# Patient Record
Sex: FEMALE | Race: WHITE | NOT HISPANIC OR LATINO | Employment: FULL TIME | URBAN - METROPOLITAN AREA
[De-identification: names, ages, dates, MRNs, and addresses within clinical notes are randomized per-mention and may not be internally consistent; named-entity substitution may affect disease eponyms.]

---

## 2020-11-18 ENCOUNTER — INITIAL CONSULT (OUTPATIENT)
Dept: UROGYNECOLOGY | Facility: CLINIC | Age: 50
End: 2020-11-18
Payer: COMMERCIAL

## 2020-11-18 VITALS
DIASTOLIC BLOOD PRESSURE: 77 MMHG | HEIGHT: 72 IN | BODY MASS INDEX: 39.68 KG/M2 | SYSTOLIC BLOOD PRESSURE: 136 MMHG | HEART RATE: 61 BPM | WEIGHT: 293 LBS

## 2020-11-18 DIAGNOSIS — N81.83 INCOMPETENCE OF RECTOVAGINAL TISSUE: ICD-10-CM

## 2020-11-18 DIAGNOSIS — N81.11 CYSTOCELE, MIDLINE: ICD-10-CM

## 2020-11-18 DIAGNOSIS — R35.0 URINARY FREQUENCY: Primary | ICD-10-CM

## 2020-11-18 DIAGNOSIS — N95.2 POSTMENOPAUSE ATROPHIC VAGINITIS: ICD-10-CM

## 2020-11-18 LAB
BILIRUB SERPL-MCNC: ABNORMAL MG/DL
BLOOD URINE, POC: ABNORMAL
CLARITY, POC UA: CLEAR
COLOR, POC UA: YELLOW
GLUCOSE UR QL STRIP: ABNORMAL
KETONES UR QL STRIP: ABNORMAL
LEUKOCYTE ESTERASE URINE, POC: ABNORMAL
NITRITE, POC UA: ABNORMAL
PH, POC UA: 5
PROTEIN, POC: ABNORMAL
SP GR UR STRIP: 1.01
UROBILINOGEN, POC UA: ABNORMAL

## 2020-11-18 PROCEDURE — 99999 PR PBB SHADOW E&M-NEW PATIENT-LVL III: CPT | Mod: PBBFAC,,, | Performed by: OBSTETRICS & GYNECOLOGY

## 2020-11-18 PROCEDURE — 99203 OFFICE O/P NEW LOW 30 MIN: CPT | Mod: 25,S$GLB,, | Performed by: OBSTETRICS & GYNECOLOGY

## 2020-11-18 PROCEDURE — 81002 URINALYSIS NONAUTO W/O SCOPE: CPT | Mod: S$GLB,,, | Performed by: OBSTETRICS & GYNECOLOGY

## 2020-11-18 PROCEDURE — 81002 POCT URINE DIPSTICK WITHOUT MICROSCOPE: ICD-10-PCS | Mod: S$GLB,,, | Performed by: OBSTETRICS & GYNECOLOGY

## 2020-11-18 PROCEDURE — 99999 PR PBB SHADOW E&M-NEW PATIENT-LVL III: ICD-10-PCS | Mod: PBBFAC,,, | Performed by: OBSTETRICS & GYNECOLOGY

## 2020-11-18 PROCEDURE — 99203 PR OFFICE/OUTPT VISIT, NEW, LEVL III, 30-44 MIN: ICD-10-PCS | Mod: 25,S$GLB,, | Performed by: OBSTETRICS & GYNECOLOGY

## 2020-11-18 RX ORDER — ESTRADIOL 0.1 MG/G
1 CREAM VAGINAL
Qty: 12 G | Refills: 11 | Status: SHIPPED | OUTPATIENT
Start: 2020-11-18 | End: 2022-03-01

## 2020-11-18 RX ORDER — MELOXICAM 7.5 MG/1
7.5 TABLET ORAL DAILY
COMMUNITY
Start: 2020-11-02 | End: 2021-11-24

## 2020-11-18 RX ORDER — DICLOFENAC SODIUM 10 MG/G
GEL TOPICAL 2 TIMES DAILY
COMMUNITY
Start: 2020-11-02 | End: 2021-11-24

## 2020-11-18 NOTE — PROGRESS NOTES
Subjective:      Patient ID: Karly Solitario is a 50 y.o. female.    Chief Complaint:  Consult      History of Present Illness  50-year-old multipara history of hysterectomy done abdominally unclear etiology presents after an ER visit where she was concerned about some type of protrusion through the vagina.  Was told that her vagina or bladder had fallen she then went to another doctor who set was assist patient is very confused she is not in any pain however there is substantial dryness vagina          No past medical history on file.    No past surgical history on file.    GYN & OB History  No LMP recorded.   Date of Last Pap: No result found    OB History   No obstetric history on file.       Health Maintenance       Date Due Completion Date    Hepatitis C Screening 1969 ---    Lipid Panel 1969 ---    HIV Screening 02/13/1984 ---    TETANUS VACCINE 02/13/1987 ---    Cervical Cancer Screening 02/13/1990 ---    Mammogram 02/13/2009 ---    Shingles Vaccine (1 of 2) 02/13/2019 ---    Colorectal Cancer Screening 02/13/2019 ---    Influenza Vaccine (1) 08/01/2020 ---          No family history on file.    Social History     Socioeconomic History    Marital status:      Spouse name: Not on file    Number of children: Not on file    Years of education: Not on file    Highest education level: Not on file   Occupational History    Not on file   Social Needs    Financial resource strain: Not on file    Food insecurity     Worry: Not on file     Inability: Not on file    Transportation needs     Medical: Not on file     Non-medical: Not on file   Tobacco Use    Smoking status: Former Smoker   Substance and Sexual Activity    Alcohol use: Not on file    Drug use: Not on file    Sexual activity: Not on file   Lifestyle    Physical activity     Days per week: Not on file     Minutes per session: Not on file    Stress: Not on file   Relationships    Social connections     Talks on phone: Not on file      Gets together: Not on file     Attends Taoist service: Not on file     Active member of club or organization: Not on file     Attends meetings of clubs or organizations: Not on file     Relationship status: Not on file   Other Topics Concern    Not on file   Social History Narrative    Not on file       Review of Systems  Review of Systems   Genitourinary: Positive for vaginal pain.     Bulge     Objective:   /77   Pulse 61   Ht 6' (1.829 m)   Wt 133 kg (293 lb 3.4 oz)   BMI 39.77 kg/m²     Physical Exam   Genitourinary: There is a rectocele, a cystocele and atrophy in the vagina. Right adnexum displays no mass. Left adnexum displays no mass. Cervix exhibits absence. Uterus is absent.   POP-Q  Aa: -1 Ba:  C: -7   GH: 3 PB: 2 TVL: 10   Ap: -1 Bp:  D:                           Assessment:     1. Urinary frequency    2. Postmenopause atrophic vaginitis    3. Incompetence of rectovaginal tissue    4. Cystocele, midline            Plan:     1. Urinary frequency    2. Postmenopause atrophic vaginitis    3. Incompetence of rectovaginal tissue    4. Cystocele, midline      After examination I then your reviewed her anatomy on the [employer] interactive web site from that point I then discussed options from  Pessary  2.  Transvaginal site specific repair with suspension  3.  Robotic sacral colpopexy.  Think at this point patient to appreciates that she has been told the proper diagnosis she is going to take to consider in with direction she wants to go but the meantime she does 1 conservative she 0 pessary we will 1st give her vaginal estrogen for period of 4-6 weeks before bring her back for pessary fitting.  Patient many questions all addressed total time of this visit 30 min greater 50% of time 20 min in direct discussion reviewing all aspects of care  There are no Patient Instructions on file for this visit.

## 2020-12-31 ENCOUNTER — OFFICE VISIT (OUTPATIENT)
Dept: UROGYNECOLOGY | Facility: CLINIC | Age: 50
End: 2020-12-31
Payer: COMMERCIAL

## 2020-12-31 VITALS
HEART RATE: 60 BPM | HEIGHT: 72 IN | WEIGHT: 293 LBS | SYSTOLIC BLOOD PRESSURE: 159 MMHG | DIASTOLIC BLOOD PRESSURE: 92 MMHG | BODY MASS INDEX: 39.68 KG/M2

## 2020-12-31 DIAGNOSIS — Z46.89 PESSARY MAINTENANCE: ICD-10-CM

## 2020-12-31 DIAGNOSIS — N95.2 ATROPHIC VAGINITIS: ICD-10-CM

## 2020-12-31 DIAGNOSIS — N81.11 CYSTOCELE, MIDLINE: ICD-10-CM

## 2020-12-31 DIAGNOSIS — R35.0 URINARY FREQUENCY: Primary | ICD-10-CM

## 2020-12-31 DIAGNOSIS — N81.6 RECTOCELE: ICD-10-CM

## 2020-12-31 LAB
BILIRUB SERPL-MCNC: ABNORMAL MG/DL
BLOOD URINE, POC: ABNORMAL
CLARITY, POC UA: CLEAR
COLOR, POC UA: YELLOW
GLUCOSE UR QL STRIP: ABNORMAL
KETONES UR QL STRIP: ABNORMAL
LEUKOCYTE ESTERASE URINE, POC: ABNORMAL
NITRITE, POC UA: ABNORMAL
PH, POC UA: 5
PROTEIN, POC: ABNORMAL
SPECIFIC GRAVITY, POC UA: 1
UROBILINOGEN, POC UA: ABNORMAL

## 2020-12-31 PROCEDURE — 57160 PR FIT/INSERT INTRAVAG SUPPORT DEVICE: ICD-10-PCS | Mod: S$GLB,,, | Performed by: NURSE PRACTITIONER

## 2020-12-31 PROCEDURE — 99214 PR OFFICE/OUTPT VISIT, EST, LEVL IV, 30-39 MIN: ICD-10-PCS | Mod: 25,S$GLB,, | Performed by: NURSE PRACTITIONER

## 2020-12-31 PROCEDURE — 99214 OFFICE O/P EST MOD 30 MIN: CPT | Mod: 25,S$GLB,, | Performed by: NURSE PRACTITIONER

## 2020-12-31 PROCEDURE — 81002 POCT URINE DIPSTICK WITHOUT MICROSCOPE: ICD-10-PCS | Mod: S$GLB,,, | Performed by: NURSE PRACTITIONER

## 2020-12-31 PROCEDURE — 99999 PR PBB SHADOW E&M-EST. PATIENT-LVL III: CPT | Mod: PBBFAC,,, | Performed by: NURSE PRACTITIONER

## 2020-12-31 PROCEDURE — 57160 INSERT PESSARY/OTHER DEVICE: CPT | Mod: S$GLB,,, | Performed by: NURSE PRACTITIONER

## 2020-12-31 PROCEDURE — 99999 PR PBB SHADOW E&M-EST. PATIENT-LVL III: ICD-10-PCS | Mod: PBBFAC,,, | Performed by: NURSE PRACTITIONER

## 2020-12-31 PROCEDURE — 81002 URINALYSIS NONAUTO W/O SCOPE: CPT | Mod: S$GLB,,, | Performed by: NURSE PRACTITIONER

## 2020-12-31 NOTE — PROGRESS NOTES
"Subjective:       Patient ID: Karly Solitario is a 50 y.o. female.    Chief Complaint: pessary trial    HPI  Karly Solitario is a 50 y.o. female who presents today for possible pessary placement.  She was originally seen by Dr. Ham on 11/18/20.  At that visit he reviewed with her that she had a cystocele and rectocele and her options regarding the prolapse.  The pt is also concerned about a "cyst" that she had in the vaginal area.  She states that this was noted on [employer] and that a Dr. Vazquez had drained the cyst and told her it was an embryonic cyst?  The pt states that she can feel something near the introitus, but it is difficult to determine if she is feeling the prolapse or the "cyst" that was drained.  Otherwise, she has been using the estrace cream and does feel that this has been helpful for her vaginal dryness and tenderness.  She would like to try the pessary but thinks in time she will consider surgical correction.  She denies any other acute complaints/concerns and is ready to proceed.    Review of Systems   Constitutional: Negative for activity change, fever and unexpected weight change.   HENT: Negative for hearing loss.    Eyes: Negative for visual disturbance.   Respiratory: Negative for shortness of breath and wheezing.    Cardiovascular: Negative for chest pain, palpitations and leg swelling.   Gastrointestinal: Negative for abdominal pain, constipation and diarrhea.   Genitourinary: Positive for frequency. Negative for dyspareunia, dysuria, urgency, vaginal bleeding and vaginal discharge.        Vaginal bulge   Musculoskeletal: Negative for gait problem and neck pain.   Skin: Negative for rash and wound.   Allergic/Immunologic: Negative for immunocompromised state.   Neurological: Negative for tremors, speech difficulty and weakness.   Hematological: Does not bruise/bleed easily.   Psychiatric/Behavioral: Negative for agitation and confusion.       Objective:      Physical Exam  Constitutional:  "      General: She is not in acute distress.     Appearance: She is well-developed.   HENT:      Head: Normocephalic and atraumatic.   Neck:      Musculoskeletal: Neck supple.      Thyroid: No thyromegaly.   Pulmonary:      Effort: Pulmonary effort is normal. No respiratory distress.   Abdominal:      Palpations: Abdomen is soft.      Tenderness: There is abdominal tenderness in the left lower quadrant.      Hernia: No hernia is present.   Musculoskeletal: Normal range of motion.   Skin:     General: Skin is warm and dry.      Findings: No rash.   Neurological:      Mental Status: She is alert and oriented to person, place, and time.   Psychiatric:         Behavior: Behavior normal.       Pelvic Exam:  V: No lesions. No palpable nodes.   Va: no discharge or bleeding. No sign of a cyst noted near the introitus.  Meatus:No caruncle or stenosis  Urethra: Non tender. No suburethral masses.  Cx/Cuff: Normal   Uterus: surgically absent  Ad: No mass, mild tenderness on the Left.  Levators :Symmetrical. Normal tone. Non tender.  BL: Non tender  RV: No hemorrhoids.    POP-Q  Aa: 0 Ba: 0 C: -7   GH: 3 PB: 2 TVL: 10   Ap: 0 Bp: 0 D:            Assessment:       1. Urinary frequency    2. Cystocele, midline    3. Rectocele    4. Pessary maintenance    5. Atrophic vaginitis          Procedure note- After betadine irrigation of the vagina, #4 ring pessary was inserted into the vagina.  Pt ambulated in the room and denies any discomfort.  Pt was able to do various exercises in the room and retain the pessary and the pessary was comfortable.  NP did review with the pt how to remove and replace the pessary herself.   NP reminded pt that the first 24-48 hours are the most likely time for the pessary to fall out and to monitor the toilet before flushing.  Pt verbalized understanding.      Plan:       Urinary frequency- monitor    Cystocele, midline- trial of #4 ring pessary    Rectocele- as noted above    Pessary maintenance- as noted  above    Atrophic vaginitis- continue with estrace cream    RTC 2-3 months

## 2021-11-24 ENCOUNTER — HOSPITAL ENCOUNTER (OUTPATIENT)
Dept: RADIOLOGY | Facility: HOSPITAL | Age: 51
Discharge: HOME | End: 2021-11-24
Attending: PODIATRIST
Payer: COMMERCIAL

## 2021-11-24 ENCOUNTER — OFFICE VISIT (OUTPATIENT)
Dept: PODIATRY | Facility: CLINIC | Age: 51
End: 2021-11-24
Payer: COMMERCIAL

## 2021-11-24 VITALS
SYSTOLIC BLOOD PRESSURE: 134 MMHG | HEIGHT: 72 IN | TEMPERATURE: 98 F | HEART RATE: 77 BPM | DIASTOLIC BLOOD PRESSURE: 76 MMHG | WEIGHT: 293 LBS | BODY MASS INDEX: 39.68 KG/M2

## 2021-11-24 DIAGNOSIS — M84.375A STRESS FRACTURE OF LEFT CALCANEUS: ICD-10-CM

## 2021-11-24 DIAGNOSIS — M79.672 FOOT PAIN, LEFT: ICD-10-CM

## 2021-11-24 DIAGNOSIS — D36.10 NEUROMA: ICD-10-CM

## 2021-11-24 DIAGNOSIS — M79.672 FOOT PAIN, LEFT: Primary | ICD-10-CM

## 2021-11-24 DIAGNOSIS — M76.70 PERONEAL TENDONITIS, UNSPECIFIED LATERALITY: ICD-10-CM

## 2021-11-24 PROCEDURE — 73630 X-RAY EXAM OF FOOT: CPT | Mod: 26,LT,, | Performed by: RADIOLOGY

## 2021-11-24 PROCEDURE — 73630 XR FOOT COMPLETE 3 VIEW LEFT: ICD-10-PCS | Mod: 26,LT,, | Performed by: RADIOLOGY

## 2021-11-24 PROCEDURE — 73630 X-RAY EXAM OF FOOT: CPT | Mod: TC,FY,LT

## 2021-11-24 PROCEDURE — 99204 OFFICE O/P NEW MOD 45 MIN: CPT | Mod: S$GLB,,, | Performed by: PODIATRIST

## 2021-11-24 PROCEDURE — 99999 PR PBB SHADOW E&M-EST. PATIENT-LVL III: CPT | Mod: PBBFAC,,, | Performed by: PODIATRIST

## 2021-11-24 PROCEDURE — 99204 PR OFFICE/OUTPT VISIT, NEW, LEVL IV, 45-59 MIN: ICD-10-PCS | Mod: S$GLB,,, | Performed by: PODIATRIST

## 2021-11-24 PROCEDURE — 99999 PR PBB SHADOW E&M-EST. PATIENT-LVL III: ICD-10-PCS | Mod: PBBFAC,,, | Performed by: PODIATRIST

## 2021-11-24 RX ORDER — ROPINIROLE 1 MG/1
1 TABLET, FILM COATED ORAL NIGHTLY
COMMUNITY
Start: 2021-11-16 | End: 2024-01-11

## 2021-11-24 RX ORDER — DICLOFENAC SODIUM 75 MG/1
75 TABLET, DELAYED RELEASE ORAL 2 TIMES DAILY
Qty: 60 TABLET | Refills: 1 | Status: SHIPPED | OUTPATIENT
Start: 2021-11-24 | End: 2021-12-24

## 2021-11-24 RX ORDER — MELOXICAM 15 MG/1
15 TABLET ORAL DAILY
COMMUNITY
Start: 2021-11-16 | End: 2021-11-24

## 2021-11-24 RX ORDER — GABAPENTIN 300 MG/1
300 CAPSULE ORAL
Qty: 180 CAPSULE | Refills: 1 | Status: SHIPPED | OUTPATIENT
Start: 2021-11-24 | End: 2021-12-24

## 2021-11-24 RX ORDER — GABAPENTIN 300 MG/1
CAPSULE ORAL
COMMUNITY
Start: 2021-11-16 | End: 2021-11-24

## 2021-11-26 ENCOUNTER — TELEPHONE (OUTPATIENT)
Dept: PODIATRY | Facility: CLINIC | Age: 51
End: 2021-11-26
Payer: COMMERCIAL

## 2021-11-27 PROBLEM — D36.10 NEUROMA: Status: ACTIVE | Noted: 2021-11-27

## 2021-11-29 ENCOUNTER — TELEPHONE (OUTPATIENT)
Dept: PODIATRY | Facility: CLINIC | Age: 51
End: 2021-11-29
Payer: COMMERCIAL

## 2021-11-30 ENCOUNTER — HOSPITAL ENCOUNTER (OUTPATIENT)
Dept: RADIOLOGY | Facility: HOSPITAL | Age: 51
Discharge: HOME | End: 2021-11-30
Attending: PODIATRIST
Payer: COMMERCIAL

## 2021-11-30 DIAGNOSIS — M84.375A STRESS FRACTURE OF LEFT CALCANEUS: ICD-10-CM

## 2021-11-30 DIAGNOSIS — M76.70 PERONEAL TENDONITIS, UNSPECIFIED LATERALITY: ICD-10-CM

## 2021-11-30 PROCEDURE — 73718 MRI FOOT (HINDFOOT) LEFT WITHOUT CONTRAST: ICD-10-PCS | Mod: 26,LT,, | Performed by: RADIOLOGY

## 2021-11-30 PROCEDURE — 73718 MRI LOWER EXTREMITY W/O DYE: CPT | Mod: 26,LT,, | Performed by: RADIOLOGY

## 2021-11-30 PROCEDURE — 73718 MRI LOWER EXTREMITY W/O DYE: CPT | Mod: TC,PN,LT

## 2021-12-01 ENCOUNTER — TELEPHONE (OUTPATIENT)
Dept: PODIATRY | Facility: CLINIC | Age: 51
End: 2021-12-01
Payer: COMMERCIAL

## 2021-12-06 ENCOUNTER — PATIENT MESSAGE (OUTPATIENT)
Dept: PODIATRY | Facility: CLINIC | Age: 51
End: 2021-12-06
Payer: COMMERCIAL

## 2021-12-06 ENCOUNTER — TELEPHONE (OUTPATIENT)
Dept: PODIATRY | Facility: CLINIC | Age: 51
End: 2021-12-06
Payer: COMMERCIAL

## 2021-12-15 ENCOUNTER — OFFICE VISIT (OUTPATIENT)
Dept: PODIATRY | Facility: CLINIC | Age: 51
End: 2021-12-15
Payer: COMMERCIAL

## 2021-12-15 VITALS
HEIGHT: 72 IN | TEMPERATURE: 98 F | HEART RATE: 93 BPM | BODY MASS INDEX: 39.68 KG/M2 | DIASTOLIC BLOOD PRESSURE: 97 MMHG | WEIGHT: 293 LBS | SYSTOLIC BLOOD PRESSURE: 153 MMHG

## 2021-12-15 DIAGNOSIS — M72.2 PLANTAR FASCIITIS: ICD-10-CM

## 2021-12-15 DIAGNOSIS — M66.872 NONTRAUMATIC RUPTURE OF TENDON OF LEFT FOOT: Primary | ICD-10-CM

## 2021-12-15 DIAGNOSIS — S93.622D LISFRANC'S SPRAIN, LEFT, SUBSEQUENT ENCOUNTER: ICD-10-CM

## 2021-12-15 PROCEDURE — 99214 PR OFFICE/OUTPT VISIT, EST, LEVL IV, 30-39 MIN: ICD-10-PCS | Mod: S$GLB,,, | Performed by: PODIATRIST

## 2021-12-15 PROCEDURE — 99999 PR PBB SHADOW E&M-EST. PATIENT-LVL III: CPT | Mod: PBBFAC,,, | Performed by: PODIATRIST

## 2021-12-15 PROCEDURE — 99999 PR PBB SHADOW E&M-EST. PATIENT-LVL III: ICD-10-PCS | Mod: PBBFAC,,, | Performed by: PODIATRIST

## 2021-12-15 PROCEDURE — 99214 OFFICE O/P EST MOD 30 MIN: CPT | Mod: S$GLB,,, | Performed by: PODIATRIST

## 2021-12-15 RX ORDER — HYDROCODONE BITARTRATE AND ACETAMINOPHEN 10; 325 MG/1; MG/1
1 TABLET ORAL 2 TIMES DAILY
Qty: 42 TABLET | Refills: 0 | Status: SHIPPED | OUTPATIENT
Start: 2021-12-15 | End: 2022-01-05

## 2021-12-28 ENCOUNTER — PATIENT MESSAGE (OUTPATIENT)
Dept: PODIATRY | Facility: CLINIC | Age: 51
End: 2021-12-28
Payer: COMMERCIAL

## 2022-01-12 ENCOUNTER — OFFICE VISIT (OUTPATIENT)
Dept: PODIATRY | Facility: CLINIC | Age: 52
End: 2022-01-12
Payer: COMMERCIAL

## 2022-01-12 ENCOUNTER — PATIENT MESSAGE (OUTPATIENT)
Dept: PODIATRY | Facility: CLINIC | Age: 52
End: 2022-01-12
Payer: COMMERCIAL

## 2022-01-12 DIAGNOSIS — S93.622D LISFRANC'S SPRAIN, LEFT, SUBSEQUENT ENCOUNTER: ICD-10-CM

## 2022-01-12 DIAGNOSIS — M72.2 PLANTAR FASCIITIS: ICD-10-CM

## 2022-01-12 DIAGNOSIS — M76.70 PERONEAL TENDONITIS, UNSPECIFIED LATERALITY: Primary | ICD-10-CM

## 2022-01-12 DIAGNOSIS — M66.872 NONTRAUMATIC RUPTURE OF TENDON OF LEFT FOOT: ICD-10-CM

## 2022-01-12 PROBLEM — M84.375A STRESS FRACTURE OF LEFT CALCANEUS: Status: RESOLVED | Noted: 2021-11-24 | Resolved: 2022-01-12

## 2022-01-12 PROCEDURE — 99999 PR PBB SHADOW E&M-EST. PATIENT-LVL II: ICD-10-PCS | Mod: PBBFAC,,, | Performed by: PODIATRIST

## 2022-01-12 PROCEDURE — 99214 OFFICE O/P EST MOD 30 MIN: CPT | Mod: S$GLB,,, | Performed by: PODIATRIST

## 2022-01-12 PROCEDURE — 99999 PR PBB SHADOW E&M-EST. PATIENT-LVL II: CPT | Mod: PBBFAC,,, | Performed by: PODIATRIST

## 2022-01-12 PROCEDURE — 99214 PR OFFICE/OUTPT VISIT, EST, LEVL IV, 30-39 MIN: ICD-10-PCS | Mod: S$GLB,,, | Performed by: PODIATRIST

## 2022-01-12 RX ORDER — HYDROCODONE BITARTRATE AND ACETAMINOPHEN 10; 325 MG/1; MG/1
1 TABLET ORAL 2 TIMES DAILY
Qty: 20 TABLET | Refills: 0 | Status: SHIPPED | OUTPATIENT
Start: 2022-01-12 | End: 2022-01-22

## 2022-01-15 NOTE — PROGRESS NOTES
Subjective:       Patient ID: Karly Solitario is a 51 y.o. female.    Chief Complaint: No chief complaint on file.  Patient presents today follow-up left foot injury.    Past Medical History:   Diagnosis Date    Foot pain, bilateral      Past Surgical History:   Procedure Laterality Date    CHOLECYSTECTOMY      CYST REMOVAL      HYSTERECTOMY       History reviewed. No pertinent family history.  Social History     Socioeconomic History    Marital status:    Tobacco Use    Smoking status: Former Smoker    Smokeless tobacco: Never Used   Substance and Sexual Activity    Alcohol use: Not Currently    Drug use: Never    Sexual activity: Not Currently       Current Outpatient Medications   Medication Sig Dispense Refill    estradioL (ESTRACE) 0.01 % (0.1 mg/gram) vaginal cream Place 1 g vaginally every Mon, Wed, Fri. 12 g 11    gabapentin (NEURONTIN) 300 MG capsule Take 1 capsule (300 mg total) by mouth 6 (six) times daily. 180 capsule 1    HYDROcodone-acetaminophen (NORCO)  mg per tablet Take 1 tablet by mouth 2 (two) times a day. for 10 days 20 tablet 0    rOPINIRole (REQUIP) 1 MG tablet Take 1 mg by mouth nightly.       No current facility-administered medications for this visit.     Review of patient's allergies indicates:   Allergen Reactions    Ciprofloxacin     Propoxyphene n-acetaminophen Other (See Comments)       Review of Systems   Musculoskeletal: Positive for arthralgias, gait problem and joint swelling.   All other systems reviewed and are negative.      Objective:      There were no vitals filed for this visit.  Physical Exam  Vitals and nursing note reviewed.   Constitutional:       Appearance: Normal appearance.   Cardiovascular:      Pulses:           Dorsalis pedis pulses are 2+ on the right side and 2+ on the left side.        Posterior tibial pulses are 2+ on the right side and 2+ on the left side.   Pulmonary:      Effort: Pulmonary effort is normal.   Musculoskeletal:          General: Swelling, tenderness, deformity and signs of injury present.      Left foot: Decreased range of motion. Deformity present.        Feet:    Feet:      Right foot:      Protective Sensation: 2 sites tested. 2 sites sensed.      Left foot:      Protective Sensation: 2 sites tested. 2 sites sensed.      Skin integrity: Erythema and warmth present.   Skin:     General: Skin is warm.      Capillary Refill: Capillary refill takes less than 2 seconds.      Findings: Erythema present.   Neurological:      General: No focal deficit present.      Mental Status: She is alert.   Psychiatric:         Mood and Affect: Mood normal.         Behavior: Behavior normal.         Thought Content: Thought content normal.         Judgment: Judgment normal.       MRI Foot (Hindfoot) Left Without Contrast  Order: [id number]   Status: Final result     Visible to patient: Yes (seen)     Next appt: 01/03/2022 at 09:00 AM in Podiatry (Manuel Em DPM)     Dx: Peroneal tendonitis, unspecified late...     1 Result Note     1 Follow-up Encounter    Details    Reading Physician Reading Date Result Priority   Pepe Ryan MD  [phone] 12/1/2021 Routine     Narrative & Impression  EXAMINATION:  MRI FOOT (HINDFOOT) LEFT WITHOUT CONTRAST     CLINICAL HISTORY:  Foot pain, stress fracture suspected, neg xray;  Stress fracture, left foot, initial encounter for fracture     TECHNIQUE:  MRI ankle performed per routine protocol without contrast.     COMPARISON:  Plain films 11/24/2021.     FINDINGS:  Ligaments: Anterior and posterior inferior tibiofibular ligaments are intact.  Anterior talofibular, calcaneofibular and posterior talofibular ligaments are intact.  Deep and superficial components of deltoid ligament are intact.  Spring ligament complex is intact.  Partial thickness tear and scarring involving the dorsal component of the Lisfranc ligament.  The interosseous and plantar components are intact.     Tendons: There is  thickening and increased signal at the insertion of the posterior tibial tendon consistent with a partial thickness tear with tendinosis.  The flexor digitorum longus and flexor hallucis longus tendons are intact with normal signal.  The dorsal ankle extensor and peroneus tendons are intact.  The Achilles tendon is intact.     Joints: No joint effusions. Tibiotalar and subtalar cartilage maintained.     Bones:  No fracture or infiltrative process.  No MRI evidence to suggest a stress fracture of the calcaneus.  Small dorsal and plantar calcaneal spurs.     Miscellaneous: There is thickening of the medial cord of the plantar fascia with mild adjacent perifascial edema consistent with chronic plantar fasciitis.  Sinus tarsi and tarsal tunnel are unremarkable.     Impression:     1. Chronic plantar fasciitis without MRI evidence to suggest calcaneal stress fracture.  2. Partial thickness tear and scarring involving the dorsal component of the Lisfranc ligament complex.  3. Partial thickness tear and tendinosis at the insertion of the posterior tibial tendon.  4. No significant joint effusion.        Electronically signed by: Pepe Ryan  Date:                                            12/01/2021  Time:                                           15:24                                Assessment:       1. Peroneal tendonitis, unspecified laterality    2. Nontraumatic rupture of tendon of left foot    3. Lisfranc's sprain, left, subsequent encounter    4. Plantar fasciitis        Plan:       Patient presents for follow-up injury to the left foot patient states she has been having some pain on the bottom of the left heel overall she is doing better she is going to go ahead and try to transition out of the fracture boot as tolerated she understands she is going to have increased swelling discomfort that is normal as she transitions out of the boot that she has been in for an extended period of time.  Patient states she  still has not started her old job where she is sitting all the time she still on her feet quite a bit at work she did have COVID over [place] and states she was off of her feet for 2 weeks which definitely helped.  Patient continues to take both the gabapentin and the diclofenac which is helping I did give her a prescription for pain medication to take only as necessary I dispensed the patient new diabetic insoles to give her even more support which I do think that she needs in the left foot and I plan to follow up with her in 3 weeks for re-evaluation certainly the patient may be out of the boot for a few hours them back into the boot for few hours as she transitions out of the boot I advised her the additional support should help to address the plantar fascial pain that she is having.  Patient has been advised to contact us with any problems questions or concerns prior to her scheduled follow-up  This note was created using [employer] voice recognition software that occasionally misinterpreted phrases or words.

## 2022-02-07 ENCOUNTER — OFFICE VISIT (OUTPATIENT)
Dept: PODIATRY | Facility: CLINIC | Age: 52
End: 2022-02-07
Payer: COMMERCIAL

## 2022-02-07 VITALS
BODY MASS INDEX: 39.68 KG/M2 | TEMPERATURE: 98 F | HEIGHT: 72 IN | WEIGHT: 293 LBS | DIASTOLIC BLOOD PRESSURE: 80 MMHG | HEART RATE: 62 BPM | SYSTOLIC BLOOD PRESSURE: 126 MMHG

## 2022-02-07 DIAGNOSIS — M79.672 FOOT PAIN, LEFT: ICD-10-CM

## 2022-02-07 DIAGNOSIS — M72.2 PLANTAR FASCIITIS: Primary | ICD-10-CM

## 2022-02-07 DIAGNOSIS — M66.872 NONTRAUMATIC RUPTURE OF TENDON OF LEFT FOOT: ICD-10-CM

## 2022-02-07 PROCEDURE — 99214 OFFICE O/P EST MOD 30 MIN: CPT | Mod: S$GLB,,, | Performed by: PODIATRIST

## 2022-02-07 PROCEDURE — 99999 PR PBB SHADOW E&M-EST. PATIENT-LVL III: CPT | Mod: PBBFAC,,, | Performed by: PODIATRIST

## 2022-02-07 PROCEDURE — 99999 PR PBB SHADOW E&M-EST. PATIENT-LVL III: ICD-10-PCS | Mod: PBBFAC,,, | Performed by: PODIATRIST

## 2022-02-07 PROCEDURE — 99214 PR OFFICE/OUTPT VISIT, EST, LEVL IV, 30-39 MIN: ICD-10-PCS | Mod: S$GLB,,, | Performed by: PODIATRIST

## 2022-02-07 RX ORDER — HYDROCODONE BITARTRATE AND ACETAMINOPHEN 10; 325 MG/1; MG/1
1 TABLET ORAL DAILY
Qty: 21 TABLET | Refills: 0 | Status: SHIPPED | OUTPATIENT
Start: 2022-02-07 | End: 2022-02-28

## 2022-02-08 NOTE — PROGRESS NOTES
Subjective:       Patient ID: Karly Solitario is a 51 y.o. female.    Chief Complaint: Foot Pain, Follow-up, and Foot Problem  Patient presents today follow-up left foot injury.    Past Medical History:   Diagnosis Date    Foot pain, bilateral      Past Surgical History:   Procedure Laterality Date    CHOLECYSTECTOMY      CYST REMOVAL      HYSTERECTOMY       History reviewed. No pertinent family history.  Social History     Socioeconomic History    Marital status:    Tobacco Use    Smoking status: Former Smoker    Smokeless tobacco: Never Used   Substance and Sexual Activity    Alcohol use: Not Currently    Drug use: Never    Sexual activity: Not Currently       Current Outpatient Medications   Medication Sig Dispense Refill    estradioL (ESTRACE) 0.01 % (0.1 mg/gram) vaginal cream Place 1 g vaginally every Mon, Wed, Fri. 12 g 11    gabapentin (NEURONTIN) 300 MG capsule Take 1 capsule (300 mg total) by mouth 6 (six) times daily. 180 capsule 1    HYDROcodone-acetaminophen (NORCO)  mg per tablet Take 1 tablet by mouth once daily. for 21 days 21 tablet 0    rOPINIRole (REQUIP) 1 MG tablet Take 1 mg by mouth nightly.       No current facility-administered medications for this visit.     Review of patient's allergies indicates:   Allergen Reactions    Ciprofloxacin     Propoxyphene n-acetaminophen Other (See Comments)       Review of Systems   Musculoskeletal: Positive for arthralgias, gait problem and joint swelling.   All other systems reviewed and are negative.      Objective:      Vitals:    02/07/22 1037   BP: 126/80   Pulse: 62   Temp: 98 °F (36.7 °C)   Weight: 136.1 kg (300 lb)   Height: 6' (1.829 m)     Physical Exam  Vitals and nursing note reviewed.   Constitutional:       Appearance: Normal appearance.   Cardiovascular:      Pulses:           Dorsalis pedis pulses are 2+ on the right side and 2+ on the left side.        Posterior tibial pulses are 2+ on the right side and 2+ on the  left side.   Pulmonary:      Effort: Pulmonary effort is normal.   Musculoskeletal:         General: Swelling, tenderness, deformity and signs of injury present.      Left foot: Decreased range of motion. Deformity present.        Feet:    Feet:      Right foot:      Protective Sensation: 2 sites tested. 2 sites sensed.      Left foot:      Protective Sensation: 2 sites tested. 2 sites sensed.      Skin integrity: Erythema and warmth present.   Skin:     General: Skin is warm.      Capillary Refill: Capillary refill takes less than 2 seconds.      Findings: Erythema present.   Neurological:      General: No focal deficit present.      Mental Status: She is alert.   Psychiatric:         Mood and Affect: Mood normal.         Behavior: Behavior normal.         Thought Content: Thought content normal.         Judgment: Judgment normal.       MRI Foot (Hindfoot) Left Without Contrast  Order: [id number]   Status: Final result     Visible to patient: Yes (seen)     Next appt: 01/03/2022 at 09:00 AM in Podiatry (Manuel Em DPM)     Dx: Peroneal tendonitis, unspecified late...     1 Result Note     1 Follow-up Encounter    Details    Reading Physician Reading Date Result Priority   Pepe Ryan MD  [phone] 12/1/2021 Routine     Narrative & Impression  EXAMINATION:  MRI FOOT (HINDFOOT) LEFT WITHOUT CONTRAST     CLINICAL HISTORY:  Foot pain, stress fracture suspected, neg xray;  Stress fracture, left foot, initial encounter for fracture     TECHNIQUE:  MRI ankle performed per routine protocol without contrast.     COMPARISON:  Plain films 11/24/2021.     FINDINGS:  Ligaments: Anterior and posterior inferior tibiofibular ligaments are intact.  Anterior talofibular, calcaneofibular and posterior talofibular ligaments are intact.  Deep and superficial components of deltoid ligament are intact.  Spring ligament complex is intact.  Partial thickness tear and scarring involving the dorsal component of the Lisfranc  ligament.  The interosseous and plantar components are intact.     Tendons: There is thickening and increased signal at the insertion of the posterior tibial tendon consistent with a partial thickness tear with tendinosis.  The flexor digitorum longus and flexor hallucis longus tendons are intact with normal signal.  The dorsal ankle extensor and peroneus tendons are intact.  The Achilles tendon is intact.     Joints: No joint effusions. Tibiotalar and subtalar cartilage maintained.     Bones:  No fracture or infiltrative process.  No MRI evidence to suggest a stress fracture of the calcaneus.  Small dorsal and plantar calcaneal spurs.     Miscellaneous: There is thickening of the medial cord of the plantar fascia with mild adjacent perifascial edema consistent with chronic plantar fasciitis.  Sinus tarsi and tarsal tunnel are unremarkable.     Impression:     1. Chronic plantar fasciitis without MRI evidence to suggest calcaneal stress fracture.  2. Partial thickness tear and scarring involving the dorsal component of the Lisfranc ligament complex.  3. Partial thickness tear and tendinosis at the insertion of the posterior tibial tendon.  4. No significant joint effusion.        Electronically signed by: Pepe Ryan  Date:                                            12/01/2021  Time:                                           15:24            Assessment:       1. Plantar fasciitis    2. Nontraumatic rupture of tendon of left foot    3. Foot pain, left        Plan:       Patient presents today for follow-up of left foot pain.  Patient states that her left heel is still causing her pain and discomfort she states that she still doing the same job where she standing all of the time she does a lot of walking she states she has not gone back to her old job which is primarily seated because of worker shortage is.  Patient states when she wears the tennis shoes or sneakers that she was wearing today her pain is definitely  not as bad however she cannot wear these at work because they are not black.  Patient states she does have to go back to wearing the fracture boot about every 3rd day because of discomfort.  On evaluation the patient is still having pain I at the plantar fascial insertion left I did explain to the patient she is clearly not getting enough support I dispensed the patient some power step full length arch supports I added additional arch padding in the form of a blue pad to the arch bilateral to give her even more support she is to use these at all times at home and at work certainly she is not to go barefoot.  Patient has subsequently stopped taking the diclofenac she went back to taking the meloxicam.  I do recommend follow-up in 3 weeks for re-evaluation I want see how the patient responds to the power step arch supports.  Patient advised to contact us with any problems questions concerns prior to scheduled follow-up.  This note was created using [employer] voice recognition software that occasionally misinterpreted phrases or words.

## 2022-02-28 ENCOUNTER — OFFICE VISIT (OUTPATIENT)
Dept: PODIATRY | Facility: CLINIC | Age: 52
End: 2022-02-28
Payer: COMMERCIAL

## 2022-02-28 VITALS
SYSTOLIC BLOOD PRESSURE: 148 MMHG | TEMPERATURE: 98 F | BODY MASS INDEX: 39.68 KG/M2 | HEART RATE: 62 BPM | HEIGHT: 72 IN | DIASTOLIC BLOOD PRESSURE: 83 MMHG | WEIGHT: 293 LBS

## 2022-02-28 DIAGNOSIS — M79.672 FOOT PAIN, LEFT: ICD-10-CM

## 2022-02-28 DIAGNOSIS — M76.70 PERONEAL TENDONITIS, UNSPECIFIED LATERALITY: ICD-10-CM

## 2022-02-28 DIAGNOSIS — M72.2 PLANTAR FASCIITIS: Primary | ICD-10-CM

## 2022-02-28 PROCEDURE — 99999 PR PBB SHADOW E&M-EST. PATIENT-LVL III: CPT | Mod: PBBFAC,,, | Performed by: PODIATRIST

## 2022-02-28 PROCEDURE — 99213 OFFICE O/P EST LOW 20 MIN: CPT | Mod: S$GLB,,, | Performed by: PODIATRIST

## 2022-02-28 PROCEDURE — 99213 PR OFFICE/OUTPT VISIT, EST, LEVL III, 20-29 MIN: ICD-10-PCS | Mod: S$GLB,,, | Performed by: PODIATRIST

## 2022-02-28 PROCEDURE — 99999 PR PBB SHADOW E&M-EST. PATIENT-LVL III: ICD-10-PCS | Mod: PBBFAC,,, | Performed by: PODIATRIST

## 2022-02-28 RX ORDER — FLUCONAZOLE 150 MG/1
TABLET ORAL
COMMUNITY
Start: 2022-02-09 | End: 2022-03-01

## 2022-02-28 RX ORDER — PREDNISONE 10 MG/1
TABLET ORAL
COMMUNITY
Start: 2022-02-09 | End: 2022-03-01

## 2022-02-28 RX ORDER — MELOXICAM 15 MG/1
15 TABLET ORAL DAILY
COMMUNITY
Start: 2022-02-09

## 2022-02-28 RX ORDER — CEPHALEXIN 500 MG/1
500 CAPSULE ORAL 3 TIMES DAILY
COMMUNITY
Start: 2022-02-09 | End: 2022-03-01

## 2022-03-01 NOTE — PROGRESS NOTES
Subjective:       Patient ID: Karly Solitario is a 52 y.o. female.    Chief Complaint: Follow-up and Foot Pain  Patient presents today follow-up left foot injury.    Past Medical History:   Diagnosis Date    Foot pain, bilateral      Past Surgical History:   Procedure Laterality Date    CHOLECYSTECTOMY      CYST REMOVAL      HYSTERECTOMY       History reviewed. No pertinent family history.  Social History     Socioeconomic History    Marital status:    Tobacco Use    Smoking status: Former Smoker    Smokeless tobacco: Never Used   Substance and Sexual Activity    Alcohol use: Not Currently    Drug use: Never    Sexual activity: Not Currently       Current Outpatient Medications   Medication Sig Dispense Refill    meloxicam (MOBIC) 15 MG tablet Take 15 mg by mouth once daily.      rOPINIRole (REQUIP) 1 MG tablet Take 1 mg by mouth nightly.      gabapentin (NEURONTIN) 300 MG capsule Take 1 capsule (300 mg total) by mouth 6 (six) times daily. 180 capsule 1     No current facility-administered medications for this visit.     Review of patient's allergies indicates:   Allergen Reactions    Ciprofloxacin     Propoxyphene n-acetaminophen Other (See Comments)       Review of Systems   Musculoskeletal: Positive for arthralgias, gait problem and joint swelling.   All other systems reviewed and are negative.      Objective:      Vitals:    02/28/22 1045   BP: (!) 148/83   Pulse: 62   Temp: 98.1 °F (36.7 °C)   Weight: 136.1 kg (300 lb)   Height: 6' (1.829 m)     Physical Exam  Vitals and nursing note reviewed.   Constitutional:       Appearance: Normal appearance.   Cardiovascular:      Pulses:           Dorsalis pedis pulses are 2+ on the right side and 2+ on the left side.        Posterior tibial pulses are 2+ on the right side and 2+ on the left side.   Pulmonary:      Effort: Pulmonary effort is normal.   Musculoskeletal:         General: Swelling, tenderness, deformity and signs of injury present.       Left foot: Decreased range of motion. Deformity present.        Feet:    Feet:      Right foot:      Protective Sensation: 2 sites tested. 2 sites sensed.      Left foot:      Protective Sensation: 2 sites tested. 2 sites sensed.      Skin integrity: Erythema and warmth present.   Skin:     General: Skin is warm.      Capillary Refill: Capillary refill takes less than 2 seconds.      Findings: Erythema present.   Neurological:      General: No focal deficit present.      Mental Status: She is alert.   Psychiatric:         Mood and Affect: Mood normal.         Behavior: Behavior normal.         Thought Content: Thought content normal.         Judgment: Judgment normal.       MRI Foot (Hindfoot) Left Without Contrast  Order: [id number]   Status: Final result     Visible to patient: Yes (seen)     Next appt: 01/03/2022 at 09:00 AM in Podiatry (Manuel Em DPM)     Dx: Peroneal tendonitis, unspecified late...     1 Result Note     1 Follow-up Encounter    Details    Reading Physician Reading Date Result Priority   Pepe Ryan MD  [phone] 12/1/2021 Routine     Narrative & Impression  EXAMINATION:  MRI FOOT (HINDFOOT) LEFT WITHOUT CONTRAST     CLINICAL HISTORY:  Foot pain, stress fracture suspected, neg xray;  Stress fracture, left foot, initial encounter for fracture     TECHNIQUE:  MRI ankle performed per routine protocol without contrast.     COMPARISON:  Plain films 11/24/2021.     FINDINGS:  Ligaments: Anterior and posterior inferior tibiofibular ligaments are intact.  Anterior talofibular, calcaneofibular and posterior talofibular ligaments are intact.  Deep and superficial components of deltoid ligament are intact.  Spring ligament complex is intact.  Partial thickness tear and scarring involving the dorsal component of the Lisfranc ligament.  The interosseous and plantar components are intact.     Tendons: There is thickening and increased signal at the insertion of the posterior tibial tendon  consistent with a partial thickness tear with tendinosis.  The flexor digitorum longus and flexor hallucis longus tendons are intact with normal signal.  The dorsal ankle extensor and peroneus tendons are intact.  The Achilles tendon is intact.     Joints: No joint effusions. Tibiotalar and subtalar cartilage maintained.     Bones:  No fracture or infiltrative process.  No MRI evidence to suggest a stress fracture of the calcaneus.  Small dorsal and plantar calcaneal spurs.     Miscellaneous: There is thickening of the medial cord of the plantar fascia with mild adjacent perifascial edema consistent with chronic plantar fasciitis.  Sinus tarsi and tarsal tunnel are unremarkable.     Impression:     1. Chronic plantar fasciitis without MRI evidence to suggest calcaneal stress fracture.  2. Partial thickness tear and scarring involving the dorsal component of the Lisfranc ligament complex.  3. Partial thickness tear and tendinosis at the insertion of the posterior tibial tendon.  4. No significant joint effusion.        Electronically signed by: Pepe Ryan  Date:                                            12/01/2021  Time:                                           15:24            Assessment:       1. Plantar fasciitis    2. Peroneal tendonitis, unspecified laterality    3. Foot pain, left        Plan:       Patient presents today for follow-up of left foot pain.  Patient relates today that she is doing considerably better she states she will be starting her new job very shortly wear so she will be sitting a lot more but ultimately she has improved considerably she states the power step full length arch supports with the additional blue arch padding have made a considerable difference in reducing all of her discomfort and especially a plantar fascial pain left.  Patient states she did make a mistake of jumping out of bed several nights ago on the cold hard floor she states when she has done this in the past her  foot is typically sore for a couple of days even with the power steps.  Patient stated she did not mean to do this but something startled her and she had to get out of bed quickly she states it has started to improve already I have advised her this should continue to improve.  Patient will be seen as needed for follow-up I do recommend she continue to take the meloxicam as directed she had been taking pain medication just at night she states she has been doing so well she has not even had to take this at night.  Patient advised in the future if this starts to return we may have to make some adjustments to the arch supports as tolerated otherwise I will fit follow up with her as needed.  This note was created using [email or web address] voice recognition software that occasionally misinterpreted phrases or words.

## 2024-01-11 ENCOUNTER — OFFICE VISIT (OUTPATIENT)
Dept: PODIATRY | Facility: CLINIC | Age: 54
End: 2024-01-11
Payer: COMMERCIAL

## 2024-01-11 VITALS
DIASTOLIC BLOOD PRESSURE: 78 MMHG | BODY MASS INDEX: 37.65 KG/M2 | HEIGHT: 72 IN | HEART RATE: 66 BPM | SYSTOLIC BLOOD PRESSURE: 131 MMHG | WEIGHT: 278 LBS

## 2024-01-11 DIAGNOSIS — M72.2 PLANTAR FASCIITIS: Primary | ICD-10-CM

## 2024-01-11 PROCEDURE — 99999 PR PBB SHADOW E&M-EST. PATIENT-LVL III: CPT | Mod: PBBFAC,,, | Performed by: PODIATRIST

## 2024-01-11 PROCEDURE — 99213 OFFICE O/P EST LOW 20 MIN: CPT | Mod: S$GLB,,, | Performed by: PODIATRIST

## 2024-01-11 RX ORDER — ROPINIROLE 2 MG/1
2 TABLET, FILM COATED ORAL NIGHTLY
COMMUNITY
Start: 2023-12-26

## 2024-01-11 RX ORDER — HYDROCODONE BITARTRATE AND ACETAMINOPHEN 10; 325 MG/1; MG/1
1 TABLET ORAL EVERY 6 HOURS PRN
COMMUNITY
Start: 2024-01-05

## 2024-01-11 RX ORDER — ESTRADIOL 0.1 MG/G
CREAM VAGINAL
COMMUNITY
Start: 2023-11-02

## 2024-01-11 RX ORDER — CYCLOBENZAPRINE HCL 10 MG
10 TABLET ORAL NIGHTLY
COMMUNITY
Start: 2023-12-06

## 2024-01-11 RX ORDER — ERGOCALCIFEROL 1.25 MG/1
50000 CAPSULE ORAL
COMMUNITY
Start: 2024-01-05

## 2024-01-15 NOTE — PROGRESS NOTES
Subjective:       Patient ID: Karly Solitario is a 53 y.o. female.    Chief Complaint: Plantar Fasciitis and Foot Pain  Patient presents today follow-up bilateral foot pain.    Past Medical History:   Diagnosis Date    Foot pain, bilateral      Past Surgical History:   Procedure Laterality Date    CHOLECYSTECTOMY      CYST REMOVAL      HYSTERECTOMY       History reviewed. No pertinent family history.  Social History     Socioeconomic History    Marital status:    Tobacco Use    Smoking status: Former    Smokeless tobacco: Never   Substance and Sexual Activity    Alcohol use: Not Currently    Drug use: Never    Sexual activity: Not Currently       Current Outpatient Medications   Medication Sig Dispense Refill    cyclobenzaprine (FLEXERIL) 10 MG tablet Take 10 mg by mouth every evening.      ergocalciferol (ERGOCALCIFEROL) 50,000 unit Cap Take 50,000 Units by mouth every 7 days.      estradioL (ESTRACE) 0.01 % (0.1 mg/gram) vaginal cream SMARTSI Applicator Vaginal Every Other Day      HYDROcodone-acetaminophen (NORCO)  mg per tablet Take 1 tablet by mouth every 6 (six) hours as needed.      meloxicam (MOBIC) 15 MG tablet Take 15 mg by mouth once daily.      rOPINIRole (REQUIP) 2 MG tablet Take 2 mg by mouth every evening.      gabapentin (NEURONTIN) 300 MG capsule Take 1 capsule (300 mg total) by mouth 6 (six) times daily. 180 capsule 1     No current facility-administered medications for this visit.     Review of patient's allergies indicates:   Allergen Reactions    Ciprofloxacin     Propoxyphene n-acetaminophen Other (See Comments)       Review of Systems   Musculoskeletal:  Positive for arthralgias, gait problem and joint swelling.   All other systems reviewed and are negative.      Objective:      Vitals:    24 1119   BP: 131/78   BP Location: Right arm   Patient Position: Sitting   Pulse: 66   Weight: 126.1 kg (278 lb)   Height: 6' (1.829 m)     Physical Exam  Vitals and nursing note reviewed.    Constitutional:       Appearance: Normal appearance.   Cardiovascular:      Pulses:           Dorsalis pedis pulses are 2+ on the right side and 2+ on the left side.        Posterior tibial pulses are 2+ on the right side and 2+ on the left side.   Pulmonary:      Effort: Pulmonary effort is normal.   Musculoskeletal:         General: Tenderness present.      Left foot: Decreased range of motion. Deformity present.        Feet:    Feet:      Right foot:      Protective Sensation: 2 sites tested.  2 sites sensed.      Left foot:      Protective Sensation: 2 sites tested.  2 sites sensed.      Skin integrity: Erythema and warmth present.   Skin:     General: Skin is warm.      Capillary Refill: Capillary refill takes less than 2 seconds.      Findings: Erythema present.   Neurological:      General: No focal deficit present.      Mental Status: She is alert.   Psychiatric:         Mood and Affect: Mood normal.         Behavior: Behavior normal.         Thought Content: Thought content normal.         Judgment: Judgment normal.          Assessment:       1. Plantar fasciitis        Plan:       Patient presents today follow-up bilateral foot pain.   Patient was last seen a proximally 2 years ago she states she has been experiencing plantar fascial pain in both feet left greater than right.  Patient states she thinks her inserts may have worn out in her feet really started a bad a few months ago.  Patient also got some new shoes and she has been having pain ever since she got the new shoes.  Patient states she has been working more because she is now working 7 days straight with a few days off and this working 7 days straight day after day after day has definitely aggravated the condition.  Patient does have the [employer]  that I had previously recommended she states that they are helping quite a bit they are very comfortable and she has not having really any pain when she wears these.  I did evaluate the patient's power  step full length arch supports they are very broken down and clearly they are not giving her enough support since she was going to replace the supports anyway I have recommended purchasing some power step control arch supports with additional blue arch pads on them this will give her even more support and should completely eliminate the plantar fascial pain she is currently having.  Patient purchase these she is going to start wearing them at all times any problems questions concerns or should her condition not resolve she is to contact us immediately. This note was created using [employer] voice recognition software that occasionally misinterpreted phrases or words.